# Patient Record
Sex: MALE | Race: OTHER | NOT HISPANIC OR LATINO
[De-identification: names, ages, dates, MRNs, and addresses within clinical notes are randomized per-mention and may not be internally consistent; named-entity substitution may affect disease eponyms.]

---

## 2024-03-27 ENCOUNTER — NON-APPOINTMENT (OUTPATIENT)
Age: 29
End: 2024-03-27

## 2024-03-28 ENCOUNTER — LABORATORY RESULT (OUTPATIENT)
Age: 29
End: 2024-03-28

## 2024-03-28 ENCOUNTER — OUTPATIENT (OUTPATIENT)
Dept: OUTPATIENT SERVICES | Facility: HOSPITAL | Age: 29
LOS: 1 days | End: 2024-03-28
Payer: COMMERCIAL

## 2024-03-28 ENCOUNTER — APPOINTMENT (OUTPATIENT)
Dept: RHEUMATOLOGY | Facility: CLINIC | Age: 29
End: 2024-03-28
Payer: COMMERCIAL

## 2024-03-28 VITALS
BODY MASS INDEX: 33.27 KG/M2 | SYSTOLIC BLOOD PRESSURE: 112 MMHG | DIASTOLIC BLOOD PRESSURE: 78 MMHG | WEIGHT: 251 LBS | OXYGEN SATURATION: 96 % | HEART RATE: 107 BPM | TEMPERATURE: 97.8 F | HEIGHT: 73 IN

## 2024-03-28 DIAGNOSIS — M79.646 PAIN IN UNSPECIFIED HAND: ICD-10-CM

## 2024-03-28 DIAGNOSIS — M54.50 LOW BACK PAIN, UNSPECIFIED: ICD-10-CM

## 2024-03-28 DIAGNOSIS — Z78.9 OTHER SPECIFIED HEALTH STATUS: ICD-10-CM

## 2024-03-28 DIAGNOSIS — Z82.3 FAMILY HISTORY OF STROKE: ICD-10-CM

## 2024-03-28 DIAGNOSIS — R76.8 OTHER SPECIFIED ABNORMAL IMMUNOLOGICAL FINDINGS IN SERUM: ICD-10-CM

## 2024-03-28 DIAGNOSIS — M79.643 PAIN IN UNSPECIFIED HAND: ICD-10-CM

## 2024-03-28 DIAGNOSIS — G89.29 LOW BACK PAIN, UNSPECIFIED: ICD-10-CM

## 2024-03-28 PROBLEM — Z00.00 ENCOUNTER FOR PREVENTIVE HEALTH EXAMINATION: Status: ACTIVE | Noted: 2024-03-28

## 2024-03-28 PROCEDURE — 72190 X-RAY EXAM OF PELVIS: CPT | Mod: 26

## 2024-03-28 PROCEDURE — 72190 X-RAY EXAM OF PELVIS: CPT

## 2024-03-28 PROCEDURE — 99205 OFFICE O/P NEW HI 60 MIN: CPT | Mod: 25

## 2024-03-28 RX ORDER — MELOXICAM 15 MG/1
15 TABLET ORAL
Refills: 0 | Status: ACTIVE | COMMUNITY

## 2024-03-28 NOTE — PHYSICAL EXAM
[General Appearance - Alert] : alert [General Appearance - Well Nourished] : well nourished [Sclera] : the sclera and conjunctiva were normal [PERRL With Normal Accommodation] : pupils were equal in size, round, and reactive to light [Outer Ear] : the ears and nose were normal in appearance [Extraocular Movements] : extraocular movements were intact [Examination Of The Oral Cavity] : the lips and gums were normal [Nasal Cavity] : the nasal mucosa and septum were normal [Oropharynx] : the oropharynx was normal [Neck Appearance] : the appearance of the neck was normal [Neck Cervical Mass (___cm)] : no neck mass was observed [Respiration, Rhythm And Depth] : normal respiratory rhythm and effort [Exaggerated Use Of Accessory Muscles For Inspiration] : no accessory muscle use [Heart Rate And Rhythm] : heart rate was normal and rhythm regular [Auscultation Breath Sounds / Voice Sounds] : lungs were clear to auscultation bilaterally [Murmurs] : no murmurs [Heart Sounds] : normal S1 and S2 [Veins - Varicosity Changes] : there were no varicosital changes [Edema] : there was no peripheral edema [Bowel Sounds] : normal bowel sounds [Abdomen Soft] : soft [Abdomen Tenderness] : non-tender [Cervical Lymph Nodes Enlarged Posterior Bilaterally] : posterior cervical [No Spinal Tenderness] : no spinal tenderness [Cervical Lymph Nodes Enlarged Anterior Bilaterally] : anterior cervical [Abnormal Walk] : normal gait [Nail Clubbing] : no clubbing  or cyanosis of the fingernails [Musculoskeletal - Swelling] : no joint swelling seen [Involuntary Movements] : no involuntary movements were seen [Motor Tone] : muscle strength and tone were normal [] : no rash [Skin Color & Pigmentation] : normal skin color and pigmentation [FreeTextEntry1] : strech markes lower back  [Cranial Nerves] : cranial nerves 2-12 were intact [Sensation] : the sensory exam was normal to light touch and pinprick [Oriented To Time, Place, And Person] : oriented to person, place, and time [Motor Exam] : the motor exam was normal [Affect] : the affect was normal

## 2024-03-28 NOTE — ASSESSMENT
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 s/p PT, physically active playing active sports [employer] and [employer] high school and college without limitations or major trauma recently decreased ability due to exercise due to BL wrist and hand pain and L midfoot pain, with exam today no synovitis , limited flexion of BL 2 digit with L 2 digit tender A1 tendon nodule  no swollen or tender joints otherwise, R wrist ECU tendon s/p surgery healed scar good range of motion of BL wrist and lumbar spine -with + TOVA 1:160 no other symptoms of systemic Lupus or other connective tissue disease with extensive review of system not suggestive of SLE but will evaluate with further serology  -with history of back pain , L midfoot pain and bl wrist and hand pain will evaluate further for inflammatory arthritis, no recent history of infections to suggest reactive arthritis, RF was normal  -check ESR, CRP , HLA B 27, CCP, BL SI joint x ray  -for further evaluation of hand pain possible tendinopathy of BL extensor digits suggested US vs MRI to discuss and follow up with orthopedic surgery

## 2024-03-29 LAB
C3 SERPL-MCNC: 176 MG/DL
C4 SERPL-MCNC: 44 MG/DL

## 2024-04-10 ENCOUNTER — APPOINTMENT (OUTPATIENT)
Dept: RHEUMATOLOGY | Facility: CLINIC | Age: 29
End: 2024-04-10
Payer: COMMERCIAL

## 2024-04-10 ENCOUNTER — TRANSCRIPTION ENCOUNTER (OUTPATIENT)
Age: 29
End: 2024-04-10

## 2024-04-10 PROCEDURE — 99215 OFFICE O/P EST HI 40 MIN: CPT

## 2024-04-10 PROCEDURE — G2211 COMPLEX E/M VISIT ADD ON: CPT

## 2024-04-10 NOTE — HISTORY OF PRESENT ILLNESS
[FreeTextEntry1] : + TOVA  1:160  and Joint pain   HPI:   28 y old M with PMH of BL wrist and hand pain  for last few years , followed by Orthopedic surgery - Hand [facility] Active high school and college Active [employer] , Baseball , no major trauma,  3/2023 L foot pain midfoot with some swelling limiting activities evaluated by Podiatry suggested Boot for 4 month but imaging  6/23 X ray and MRI and CT scan of L foot was no sig changes, then PT tendinopathy, used Boot 6-10/23 no improvement in pain , 10/23 evaluated by other Podiatry removed boot used ankle brace , shoe inserts and Meloxicam 15 mg mostly daily for few month improved symptoms, and then after PT markedly improved, 80 % percent improvement,  2019  Wrist pain evaluated by Hand Surgery had MRI showed BL ECU tear and s/p repair R wrist 8/2019 , with improved pain, improved with OT  BL hand pain  R hand thumb and 2 digit also  pain , occasionally 3rd digit, L hand  2,3 pain with activity, with bl hand pain and discomfort throughout and worse with activity , no swelling ,  No sig back pain , but felt R hip discomfort with getting in and out of car   less then a week never had similar episode    Medications : Meloxicam   system review:   General: fatigue, weight change , fever, night sweats Eyes: pain, redness, loss of vision, double or blurred vision ,dryness Throat/Neck: frequent mouth sores , hoarseness , difficulty swallowing , pain in jaw while swallowing Mouth: mouth sores, dryness, loss of taste, recent increase in tooth cavities Ears: ringing in the years, loss of hearing, ear pain or discharge Nose: nose sores, loss of smell , nose bleeds Heat and Lungs : pain , irregular heartbeat, trouble breathing, cough, coughing of blood, loss of consciences , swollen legs or feet Blood: Anemia, bleeding tendency, easy bruising,  Stomach and Intestines: nausea, vomiting, heartburn, stomach pain , persistent diarrhea, blood in stool , black stool Kidney/Urine/Bladder: difficulty urination , frequent urination, blood in urine, cloudy urine,  discharge from penis/vagina, rash ulcers, vaginal dryness  Nervous system: headaches, dizziness, numbness or tingling hand/feet, muscle weakness , memory loss , seizures Psychiatric: difficulty falling asleep, difficulty staying asleep, depression, anxiety For Women only: Age period started, number of pregnancy, miscarriages, Menopause Skin: Rash, photosensitivity , hives, skin tightness, nodules , hair loss, color change of hands and feet with cold exposure ( Raynaud)    Muscle/Joints/Bones:  Morning stiffness  lasting , joint pain, joint swelling, muscle pain   FH : no autoimmune disease in family , smoking marijuana - helps with insomnia, alcohol occasional

## 2024-04-10 NOTE — ASSESSMENT
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 s/p PT, physically active playing active sports [employer] and [employer] high school and college without limitations or major trauma recently decreased ability due to exercise due to BL wrist and hand pain and L midfoot pain, with exam today no synovitis , limited flexion of BL 2 digit with L 2 digit tender A1 tendon nodule  no swollen or tender joints otherwise, R wrist ECU tendon s/p surgery healed scar good range of motion of BL wrist and lumbar spine, since last visit seen by Hand Surgery at [facility] received bl  L hand 2,3 trigger finger and R 2 digit trigger finger steroid injection  with improved range of motion and pain but persistent mild pain, no other new symptoms rest of the review symptoms remain negative, no recent infections also   -with + TOVA 1:160 no other symptoms of systemic Lupus or other connective tissue disease with extensive review of system not suggestive of SLE, with + TOVA, also DS DNA and + LA , B2 glycoprotein and LUCY positive concerning for SLE with APLS but no history of thrombosis and no other symptoms of SLE then possible arthritis no evidence of other organ involvement  -bl hand pain improved with BL trigger finger injections improved pain , hand x ray did not show sig changes, proceed with L Hand MRI to evaluate for inflammatory arthritis vs tendinopthy  vs osteoarthritis   -with history of back pain , L midfoot pain and bl wrist and hand pain will evaluate further for inflammatory arthritis, no recent history of infections to suggest reactive arthritis, RF was normal CCP also normal with intermittent back pain also evaluated with Pelvic x ray and as possible peripheral inflammatory arthritis and back pain checked HLA B 27 that was also negative   -plan discussed with the patient follow up after L hand MRI

## 2024-04-10 NOTE — DATA REVIEWED
[FreeTextEntry1] : 3/28/24  WBC at 10.6 increased  H/H and platelets not decreased   AST and ALT normal GFR normal  TOVA  1:160  nucleolar pattern ,  + DS DNA 9 ( range <4)  ESR 64 increased  CRP  16  Diluated todd viper and Silica LA +,  + [id number]. IgG 33.7  and IgM 23.5   , LUCY + IGG 42.6  + IgM  28.6  RF and CCP normal  HLA B 27 negatie  ACE normal , SCL 70 normal , Campbell and RNP normal , SSA and SSB negative,    IgG 1 , IgG 2  , IgG  3 , IGG  4 normal  and total IGG also normal  SPEP normal electrophoresis pattern , MARLEE no monoclonal band   Thyroid abs normal   C3 and C4 not decreased  TSH wnl   negative HIV, Syphilis- trep ab  negative, HBV Core IgM negative, Hepatitis B surface Ag , Hep B Surface ab  + 13 immune  , HCV negative  ,   Pelvic x ray ( hip and SI joint) The joint spaces are preserved. No erosive changes visualize  Records from [facility] done by Orthopedic surgery  3/14/24  RF negative  CBC normal  TOVA 1:160 nucleolar pattern

## 2024-04-11 ENCOUNTER — TRANSCRIPTION ENCOUNTER (OUTPATIENT)
Age: 29
End: 2024-04-11

## 2024-04-12 ENCOUNTER — TRANSCRIPTION ENCOUNTER (OUTPATIENT)
Age: 29
End: 2024-04-12

## 2024-04-30 LAB
ACE BLD-CCNC: 32 U/L
ALBUMIN MFR SERPL ELPH: 55.8 %
ALBUMIN SERPL ELPH-MCNC: 4.9 G/DL
ALBUMIN SERPL-MCNC: 4.6 G/DL
ALBUMIN/GLOB SERPL: 1.3 RATIO
ALP BLD-CCNC: 147 U/L
ALPHA1 GLOB MFR SERPL ELPH: 5.2 %
ALPHA1 GLOB SERPL ELPH-MCNC: 0.4 G/DL
ALPHA2 GLOB MFR SERPL ELPH: 10.7 %
ALPHA2 GLOB SERPL ELPH-MCNC: 0.9 G/DL
ALT SERPL-CCNC: 19 U/L
ANA PAT FLD IF-IMP: ABNORMAL
ANA SER IF-ACNC: ABNORMAL
ANION GAP SERPL CALC-SCNC: 18 MMOL/L
AST SERPL-CCNC: 16 U/L
B-GLOBULIN MFR SERPL ELPH: 13.2 %
B-GLOBULIN SERPL ELPH-MCNC: 1.1 G/DL
B2 GLYCOPROT1 IGA SERPL IA-ACNC: 12.6 SAU
BILIRUB SERPL-MCNC: 0.5 MG/DL
BUN SERPL-MCNC: 22 MG/DL
CALCIUM SERPL-MCNC: 10.2 MG/DL
CCP AB SER IA-ACNC: <8 UNITS
CHLORIDE SERPL-SCNC: 102 MMOL/L
CO2 SERPL-SCNC: 22 MMOL/L
CREAT SERPL-MCNC: 1.04 MG/DL
CRP SERPL-MCNC: 16 MG/L
DSDNA AB SER-ACNC: 9 IU/ML
EGFR: 100 ML/MIN/1.73M2
ENA RNP AB SER IA-ACNC: 0.2 AL
ENA SCL70 IGG SER IA-ACNC: <0.2 AL
ENA SM AB SER IA-ACNC: <0.2 AL
ENA SS-A AB SER IA-ACNC: <0.2 AL
ENA SS-B AB SER IA-ACNC: <0.2 AL
ERYTHROCYTE [SEDIMENTATION RATE] IN BLOOD BY WESTERGREN METHOD: 64 MM/HR
GAMMA GLOB FLD ELPH-MCNC: 1.2 G/DL
GAMMA GLOB MFR SERPL ELPH: 15.1 %
GLUCOSE SERPL-MCNC: 111 MG/DL
HBV CORE IGM SER QL: NONREACTIVE
HBV SURFACE AB SERPL IA-ACNC: 13.1 MIU/ML
HBV SURFACE AG SER QL: NONREACTIVE
HCT VFR BLD CALC: 46.9 %
HCV AB SER QL: NONREACTIVE
HCV S/CO RATIO: 0.13 S/CO
HGB BLD-MCNC: 15.5 G/DL
HIV1+2 AB SPEC QL IA.RAPID: NONREACTIVE
HLA-B27 QL NAA+PROBE: NORMAL
IGG SER QL IEP: 1201 MG/DL
IGG1 SER-MCNC: 602 MG/DL
IGG2 SER-MCNC: 472 MG/DL
IGG3 SER-MCNC: 61.2 MG/DL
IGG4 SER-MCNC: 43.7 MG/DL
INTERPRETATION SERPL IEP-IMP: NORMAL
M PROTEIN SPEC IFE-MCNC: NORMAL
MCHC RBC-ENTMCNC: 28.5 PG
MCHC RBC-ENTMCNC: 33 GM/DL
MCV RBC AUTO: 86.4 FL
PLATELET # BLD AUTO: 353 K/UL
POTASSIUM SERPL-SCNC: 4.2 MMOL/L
PROT SERPL-MCNC: 8.2 G/DL
RBC # BLD: 5.43 M/UL
RBC # FLD: 13.8 %
RF+CCP IGG SER-IMP: NEGATIVE
SODIUM SERPL-SCNC: 142 MMOL/L
T PALLIDUM AB SER QL IA: NEGATIVE
THYROGLOB AB SERPL-ACNC: <20 IU/ML
THYROPEROXIDASE AB SERPL IA-ACNC: 17.5 IU/ML
TSH SERPL-ACNC: 2.09 UIU/ML
WBC # FLD AUTO: 10.66 K/UL

## 2024-05-07 ENCOUNTER — TRANSCRIPTION ENCOUNTER (OUTPATIENT)
Age: 29
End: 2024-05-07

## 2024-05-08 ENCOUNTER — APPOINTMENT (OUTPATIENT)
Dept: RHEUMATOLOGY | Facility: CLINIC | Age: 29
End: 2024-05-08
Payer: COMMERCIAL

## 2024-05-08 DIAGNOSIS — M25.542 PAIN IN JOINTS OF RIGHT HAND: ICD-10-CM

## 2024-05-08 DIAGNOSIS — M25.541 PAIN IN JOINTS OF RIGHT HAND: ICD-10-CM

## 2024-05-08 PROCEDURE — 99214 OFFICE O/P EST MOD 30 MIN: CPT

## 2024-05-08 RX ORDER — MELOXICAM 15 MG/1
15 TABLET ORAL
Qty: 30 | Refills: 0 | Status: ACTIVE | COMMUNITY
Start: 2024-05-08 | End: 1900-01-01

## 2024-05-08 NOTE — DATA REVIEWED
[FreeTextEntry1] : 3/28/24  WBC at 10.6 increased  H/H and platelets not decreased   AST and ALT normal GFR normal  TOVA  1:160  nucleolar pattern ,  + DS DNA 9 ( range <4)  ESR 64 increased  CRP  16  Diluated todd viper and Silica LA +,  + [id number]. IgG 33.7  and IgM 23.5   , LUCY + IGG 42.6  + IgM  28.6  RF and CCP normal  HLA B 27 negatie  ACE normal , SCL 70 normal , Campbell and RNP normal , SSA and SSB negative,    IgG 1 , IgG 2  , IgG  3 , IGG  4 normal  and total IGG also normal  SPEP normal electrophoresis pattern , MARLEE no monoclonal band   Thyroid abs normal   C3 and C4 not decreased  TSH wnl   negative HIV, Syphilis- trep ab  negative, HBV Core IgM negative, Hepatitis B surface Ag , Hep B Surface ab  + 13 immune  , HCV negative  ,   Pelvic x ray ( hip and SI joint) The joint spaces are preserved. No erosive changes visualize  Records from [facility] done by Orthopedic surgery  3/14/24  RF negative  CBC normal  TOVA 1:160 nucleolar pattern

## 2024-05-08 NOTE — HISTORY OF PRESENT ILLNESS
[___ Week(s) Ago] : [unfilled] week(s) ago [FreeTextEntry1] : Had MRI at [facility] - will upload results to the system  Seen by Orthopedic Surgery at [facility]  had BL hand trigger finger injections that improved pain Trigger finger pain improved but persistent L hand plantar and distal surface pain worse with activity and also feels at rest intermittent no numbness and tingling of fingers , no morning stiffness or other joint swelling  rest of the review system negative

## 2024-05-08 NOTE — ASSESSMENT
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 s/p PT, physically active playing active sports [employer] and [employer] high school and college without limitations or major trauma recently decreased ability due to exercise due to BL wrist and hand pain and L midfoot pain, with exam today no synovitis , limited flexion of BL 2 digit with L 2 digit tender A1 tendon nodule  no swollen or tender joints otherwise, R wrist ECU tendon s/p surgery healed scar good range of motion of BL wrist and lumbar spine, since last visit seen by Hand Surgery at [facility] received bl  L hand 2,3 trigger finger and R 2 digit trigger finger steroid injection  with improved range of motion and pain but persistent mild pain, no other new symptoms rest of the review symptoms remain negative, no recent infections also , with evaluation for underlying inflammatory arthritis + TOVA and Also DS DNA but no other symptoms of SLE   -with + TOVA 1:160 no other symptoms of systemic Lupus or other connective tissue disease with extensive review of system not suggestive of SLE, with + TOVA, also DS DNA and + LA , B2 glycoprotein and LUCY positive concerning for SLE with APLS but no history of thrombosis and no other symptoms of SLE then possible arthritis no evidence of other organ involvement   -bl hand pain improved with BL trigger finger injections improved pain , hand x ray did not show sig changes, proceed with L Hand MRI to evaluate for inflammatory arthritis vs tendinopthy  vs osteoarthritis done 5/3 will upload results to the system and fax to Hand Surgery suggested follow up with hand surgery as worse L hand pain possible flexor and extensor tendon involvement   -with history of back pain , L midfoot pain and bl wrist and hand pain will evaluate further for inflammatory arthritis, no recent history of infections to suggest reactive arthritis, RF was normal CCP also normal with intermittent back pain also evaluated with Pelvic x ray and as possible peripheral inflammatory arthritis and back pain checked HLA B 27 that was also negative   -plan discussed with the patient follow up after evaluation with Hand Surgery and repeat APLS 6/24

## 2024-05-08 NOTE — PHYSICAL EXAM
[General Appearance - In No Acute Distress] : in no acute distress [General Appearance - Well Nourished] : well nourished [] : no respiratory distress [FreeTextEntry1] : no visibl facial rash

## 2024-06-07 ENCOUNTER — TRANSCRIPTION ENCOUNTER (OUTPATIENT)
Age: 29
End: 2024-06-07

## 2024-06-07 DIAGNOSIS — R76.8 OTHER SPECIFIED ABNORMAL IMMUNOLOGICAL FINDINGS IN SERUM: ICD-10-CM

## 2024-07-19 ENCOUNTER — APPOINTMENT (OUTPATIENT)
Dept: RHEUMATOLOGY | Facility: CLINIC | Age: 29
End: 2024-07-19
Payer: COMMERCIAL

## 2024-07-19 PROCEDURE — G2211 COMPLEX E/M VISIT ADD ON: CPT

## 2024-07-19 PROCEDURE — 99215 OFFICE O/P EST HI 40 MIN: CPT

## 2024-07-19 RX ORDER — PREDNISONE 10 MG/1
10 TABLET ORAL DAILY
Qty: 63 | Refills: 0 | Status: ACTIVE | COMMUNITY
Start: 2024-07-19 | End: 1900-01-01

## 2024-08-07 ENCOUNTER — APPOINTMENT (OUTPATIENT)
Dept: RHEUMATOLOGY | Facility: CLINIC | Age: 29
End: 2024-08-07

## 2024-08-07 PROCEDURE — G2211 COMPLEX E/M VISIT ADD ON: CPT

## 2024-08-07 PROCEDURE — 99215 OFFICE O/P EST HI 40 MIN: CPT

## 2024-08-07 NOTE — DATA REVIEWED
[FreeTextEntry1] : 3/28/24  WBC at 10.6 increased  H/H and platelets not decreased   AST and ALT normal GFR normal  TOVA  1:160  nucleolar pattern ,  + DS DNA 9 ( range <4)  ESR 64 increased  CRP  16  Diluated todd viper and Silica LA +,  + [id number]. IgG 33.7  and IgM 23.5   , LUCY + IGG 42.6  + IgM  28.6  RF and CCP normal  HLA B 27 negatie  ACE normal , SCL 70 normal , Campbell and RNP normal , SSA and SSB negative,    IgG 1 , IgG 2  , IgG  3 , IGG  4 normal  and total IGG also normal  SPEP normal electrophoresis pattern , MARLEE no monoclonal band   Thyroid abs normal   C3 and C4 not decreased  TSH wnl   negative HIV, Syphilis- trep ab  negative, HBV Core IgM negative, Hepatitis B surface Ag , Hep B Surface ab  + 13 immune  , HCV negative  ,   Pelvic x ray ( hip and SI joint) The joint spaces are preserved. No erosive changes visualize  Records from [facility] done by Orthopedic surgery  3/14/24  RF negative  CBC normal  TOVA 1:160 nucleolar pattern

## 2024-08-07 NOTE — PHYSICAL EXAM
[FreeTextEntry1] : swollen L hand with pain with flexion and extension of wrist and also finger movement 
[FreeTextEntry1] : swollen L hand with pain with flexion and extension of wrist and also finger movement 
none

## 2024-08-07 NOTE — ASSESSMENT
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 s/p PT, physically active playing active sports Baseball and Foot ball high school and college without limitations or major trauma recently decreased ability due to exercise due to BL wrist and hand pain and L midfoot pain, with exam today no synovitis , limited flexion of BL 2 digit with L 2 digit tender A1 tendon nodule  no swollen or tender joints otherwise, R wrist ECU tendon s/p surgery healed scar good range of motion of BL wrist and lumbar spine, since last visit seen by Hand Surgery at [facility] received bl  L hand 2,3 trigger finger and R 2 digit trigger finger steroid injection  with improved range of motion and pain but persistent mild pain, no other new symptoms rest of the review symptoms remain negative, no recent infections also , with evaluation for underlying inflammatory arthritis + TOVA and Also DS DNA but no other symptoms of SLE  ,with repeat Blood work reviewed and scanned to system from 7/2/24 , persistent worse L hand pain and swelling also episode of R elbow pain and L knee patellar area pain no other symptoms of SLE   -with + TOVA 1:160 no other symptoms of systemic Lupus or other connective tissue disease with extensive review of system not suggestive of SLE  - Had  + TOVA, also DS DNA and + LA , B2 glycoprotein and LUCY positive concerning for SLE with APLS -- repeat Serologies TOVA down to 1:40 and still low positive indeterminate DS DNA at 8,  not suggestive of SLE but symptoms concerning for seronegative inflammatory arthritis  treated started 7/19  prednisone 40 mg with weekly taper by 10 mg over 4 weeks, discussed side affects discussed, with markedly improved pain knee pain resoled elbow pain 50% better at rest no hand pain but with strenuous activity with hands still pain around 4 out of 10 , prior to prednisone was 7 out of 10 , suggestive of inflammatory arthritis, suggested to start HCQ , side affects discussed -start  mg twice a day, and decrease prednisone to 5 mg daily   -had  MRI of R hand and  planned follow up with Hand surgery at [facility]  ( had R hand trigger point injections at [facility] 5/24 with recurrent pain and swelling )  Hand MRI showed no major tendon injury , L hand 3,2 CMC DJD changes with bone 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  had prior traumas with tendon tears but no recent trauma   -suggested to start  mg twice a day , side affects discussed   - no history of thrombosis  repeat APLS 7/2 + LA , [id number] and LUCY IGG And IGM consistent with APLS without known thrombotic event discussed increased risk with thrombosis not smoking avoid prolonged immobility with surgery or immobilization we might have to give DVT prophylaxis, discussed also possible HCQ  -with history of back pain , L midfoot pain and bl wrist and hand pain will evaluate further for inflammatory arthritis, no recent history of infections to suggest reactive arthritis, RF was normal CCP also normal with intermittent back pain also evaluated with Pelvic x ray and as possible peripheral inflammatory arthritis and back pain checked HLA B 27 that was also negative   -plan discussed with the patient follow up after evaluation with Hand Surgery , follow up with us 3 weeks prefers Video Visit

## 2024-08-07 NOTE — DATA REVIEWED
[FreeTextEntry1] : 3/28/24  WBC at 10.6 increased  H/H and platelets not decreased   AST and ALT normal GFR normal  TOVA  1:160  nucleolar pattern ,  + DS DNA 9 ( range <4)  ESR 64 increased  CRP  16  Diluated todd viper and Silica LA +,  + [id number]. IgG 33.7  and IgM 23.5   , LUCY + IGG 42.6  + IgM  28.6  RF and CCP normal  HLA B 27 negatie  ACE normal , SCL 70 normal , Campbell and RNP normal , SSA and SSB negative,    IgG 1 , IgG 2  , IgG  3 , IGG  4 normal  and total IGG also normal  SPEP normal electrophoresis pattern , MARLEE no monoclonal band   Thyroid abs normal   C3 and C4 not decreased  TSH wnl   negative HIV, Syphilis- trep ab  negative, HBV Core IgM negative, Hepatitis B surface Ag , Hep B Surface ab  + 13 immune  , HCV negative  ,   Pelvic x ray ( hip and SI joint) The joint spaces are preserved. No erosive changes visualize  Records from [facility] done by Orthopedic surgery  3/14/24  RF negative  CBC normal  TOVA 1:160 nucleolar pattern

## 2024-08-07 NOTE — ASSESSMENT
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 s/p PT, physically active playing active sports Baseball and Foot ball high school and college without limitations or major trauma recently decreased ability due to exercise due to BL wrist and hand pain and L midfoot pain, with exam today no synovitis , limited flexion of BL 2 digit with L 2 digit tender A1 tendon nodule  no swollen or tender joints otherwise, R wrist ECU tendon s/p surgery healed scar good range of motion of BL wrist and lumbar spine, since last visit seen by Hand Surgery at [facility] received bl  L hand 2,3 trigger finger and R 2 digit trigger finger steroid injection  with improved range of motion and pain but persistent mild pain, no other new symptoms rest of the review symptoms remain negative, no recent infections also , with evaluation for underlying inflammatory arthritis + TOVA and Also DS DNA but no other symptoms of SLE  ,with repeat Blood work reviewed and scanned to system from 7/2/24 , persistent worse L hand pain and swelling also episode of R elbow pain and L knee patellar area pain no other symptoms of SLE   -with + TOVA 1:160 no other symptoms of systemic Lupus or other connective tissue disease with extensive review of system not suggestive of SLE  - Had  + TOVA, also DS DNA and + LA , B2 glycoprotein and LUCY positive concerning for SLE with APLS -- repeat Serologies TOVA down to 1:40 and still low positive indeterminate DS DNA at 8,  not suggestive of SLE but symptoms concerning for seronegative inflammatory arthritis  treated started 7/19  prednisone 40 mg with weekly taper by 10 mg over 4 weeks, discussed side affects discussed, with markedly improved pain knee pain resoled elbow pain 50% better at rest no hand pain but with strenuous activity with hands still pain around 4 out of 10 , prior to prednisone was 7 out of 10 , suggestive of inflammatory arthritis, suggested to start HCQ , side affects discussed -start  mg twice a day, and decrease prednisone to 5 mg daily   -had  MRI of R hand and  planned follow up with Hand surgery at [facility]  ( had R hand trigger point injections at [facility] 5/24 with recurrent pain and swelling )  Hand MRI showed no major tendon injury , L hand 3,2 CMC DJD changes with bone 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  had prior traumas with tendon tears but no recent trauma   -suggested to start  mg twice a day , side affects discussed   - no history of thrombosis  repeat APLS 7/2 + LA , [id number] and LUCY IGG And IGM consistent with APLS without known thrombotic event discussed increased risk with thrombosis not smoking avoid prolonged immobility with surgery or immobilization we might have to give DVT prophylaxis, discussed also possible HCQ  -with history of back pain , L midfoot pain and bl wrist and hand pain will evaluate further for inflammatory arthritis, no recent history of infections to suggest reactive arthritis, RF was normal CCP also normal with intermittent back pain also evaluated with Pelvic x ray and as possible peripheral inflammatory arthritis and back pain checked HLA B 27 that was also negative   -plan discussed with the patient follow up after evaluation with Hand Surgery , follow up with us 3 weeks prefers Video Visit

## 2024-08-07 NOTE — HISTORY OF PRESENT ILLNESS
[___ Week(s) Ago] : [unfilled] week(s) ago [FreeTextEntry1] : 8/7/24  Patient is feeling better started prednsione 40 mg with weekly 10 mg taper 7/19 down to 20 mg daily and will start 10 mg from this week , at rest 100 percent improved pain of knee and elbow, and hands also but with certain positions with hand - is  hand pain 4 out of 10 was 7 prior to prednisone,  no other new symptoms rest of the review system negative   7/19/24  follow up today had blood work 7/2/24 scanned to system reviewed  + TOVA lower titer 1:40, also DS DNA again low 8,  CRP high at 21  + LUCY IGG, IGM, [id number] and also + Lupus Anticoagulatn consistent with APLS   patient has worse L hand pain with dorsi and plantiflexion of the wrist also movement of fingers, had also R elbow pain and also L anterior patellar area pain no joint swelling , morning stiffness s some of the days otherwise no new symptoms no skin rash no photosensitivity no oral ulcers, no uveitis , no sicca symptoms, no ryanoid, no sob no chest pain no dysuria no blood in urine or stool  rest of the review system negative, had L hand MRI Will fax us results , has to follow up with Hand surgery at [facility]  --- 5/8/24 Had MRI at [facility] - will upload results to the system  Seen by Orthopedic Surgery at [facility]  had BL hand trigger finger injections that improved pain Trigger finger pain improved but persistent L hand plantar and distal surface pain worse with activity and also feels at rest intermittent no numbness and tingling of fingers , no morning stiffness or other joint swelling  rest of the review system negative

## 2024-08-07 NOTE — HISTORY OF PRESENT ILLNESS
[___ Week(s) Ago] : [unfilled] week(s) ago [FreeTextEntry1] : 8/7/24  Patient is feeling better started prednsione 40 mg with weekly 10 mg taper 7/19 down to 20 mg daily and will start 10 mg from this week , at rest 100 percent improved pain of knee and elbow, and hands also but with certain positions with hand - is  hand pain 4 out of 10 was 7 prior to prednisone,  no other new symptoms rest of the review system negative   7/19/24  follow up today had blood work 7/2/24 scanned to system reviewed  + TOVA lower titer 1:40, also DS DNA again low 8,  CRP high at 21  + LUCY IGG, IGM, [id number] and also + Lupus Anticoagulatn consistent with APLS   patient has worse L hand pain with dorsi and plantiflexion of the wrist also movement of fingers, had also R elbow pain and also L anterior patellar area pain no joint swelling , morning stiffness s some of the days otherwise no new symptoms no skin rash no photosensitivity no oral ulcers, no uveitis , no sicca symptoms, no ryanoid, no sob no chest pain no dysuria no blood in urine or stool  rest of the review system negative, had L hand MRI Will fax us results , has to follow up with Hand surgery at [facility] [place]  --- 5/8/24 Had MRI at [facility] - will upload results to the system  Seen by Orthopedic Surgery at [facility] [place]  had BL hand trigger finger injections that improved pain Trigger finger pain improved but persistent L hand plantar and distal surface pain worse with activity and also feels at rest intermittent no numbness and tingling of fingers , no morning stiffness or other joint swelling  rest of the review system negative

## 2024-08-20 ENCOUNTER — TRANSCRIPTION ENCOUNTER (OUTPATIENT)
Age: 29
End: 2024-08-20

## 2024-09-05 ENCOUNTER — TRANSCRIPTION ENCOUNTER (OUTPATIENT)
Age: 29
End: 2024-09-05

## 2024-11-12 ENCOUNTER — RX RENEWAL (OUTPATIENT)
Age: 29
End: 2024-11-12

## 2024-12-02 ENCOUNTER — RX RENEWAL (OUTPATIENT)
Age: 29
End: 2024-12-02

## 2024-12-10 ENCOUNTER — TRANSCRIPTION ENCOUNTER (OUTPATIENT)
Age: 29
End: 2024-12-10

## 2024-12-11 ENCOUNTER — TRANSCRIPTION ENCOUNTER (OUTPATIENT)
Age: 29
End: 2024-12-11

## 2025-04-10 ENCOUNTER — TRANSCRIPTION ENCOUNTER (OUTPATIENT)
Age: 30
End: 2025-04-10

## 2025-05-01 ENCOUNTER — TRANSCRIPTION ENCOUNTER (OUTPATIENT)
Age: 30
End: 2025-05-01

## 2025-08-07 ENCOUNTER — TRANSCRIPTION ENCOUNTER (OUTPATIENT)
Age: 30
End: 2025-08-07

## 2025-09-03 ENCOUNTER — RX RENEWAL (OUTPATIENT)
Age: 30
End: 2025-09-03

## 2025-09-05 ENCOUNTER — TRANSCRIPTION ENCOUNTER (OUTPATIENT)
Age: 30
End: 2025-09-05

## 2025-09-08 ENCOUNTER — TRANSCRIPTION ENCOUNTER (OUTPATIENT)
Age: 30
End: 2025-09-08

## 2025-09-11 ENCOUNTER — TRANSCRIPTION ENCOUNTER (OUTPATIENT)
Age: 30
End: 2025-09-11